# Patient Record
Sex: FEMALE | Race: AMERICAN INDIAN OR ALASKA NATIVE | Employment: UNEMPLOYED | URBAN - METROPOLITAN AREA
[De-identification: names, ages, dates, MRNs, and addresses within clinical notes are randomized per-mention and may not be internally consistent; named-entity substitution may affect disease eponyms.]

---

## 2024-04-29 ENCOUNTER — HOSPITAL ENCOUNTER (OUTPATIENT)
Age: 49
Discharge: HOME | End: 2024-04-29

## 2024-04-29 VITALS
TEMPERATURE: 98 F | SYSTOLIC BLOOD PRESSURE: 151 MMHG | HEART RATE: 89 BPM | DIASTOLIC BLOOD PRESSURE: 79 MMHG | OXYGEN SATURATION: 99 % | RESPIRATION RATE: 16 BRPM

## 2024-04-29 DIAGNOSIS — N81.4 UTERINE PROLAPSE: ICD-10-CM

## 2024-04-29 DIAGNOSIS — N76.0 VAGINOSIS: Primary | ICD-10-CM

## 2024-04-29 LAB
B-HCG UR QL: NEGATIVE
BILIRUB UR QL STRIP: NEGATIVE
COLOR UR: YELLOW
GLUCOSE UR STRIP-MCNC: NEGATIVE MG/DL
HGB UR QL STRIP: NEGATIVE
KETONES UR STRIP-MCNC: NEGATIVE MG/DL
NITRITE UR QL STRIP: NEGATIVE
PH UR STRIP: 6 [PH]
SP GR UR STRIP: 1.02
UROBILINOGEN UR STRIP-ACNC: <2 MG/DL

## 2024-04-29 PROCEDURE — 87591 N.GONORRHOEAE DNA AMP PROB: CPT

## 2024-04-29 PROCEDURE — 81514 NFCT DS BV&VAGINITIS DNA ALG: CPT

## 2024-04-29 PROCEDURE — 81025 URINE PREGNANCY TEST: CPT

## 2024-04-29 PROCEDURE — 87491 CHLMYD TRACH DNA AMP PROBE: CPT

## 2024-04-29 PROCEDURE — 87077 CULTURE AEROBIC IDENTIFY: CPT

## 2024-04-29 PROCEDURE — 87086 URINE CULTURE/COLONY COUNT: CPT

## 2024-04-29 PROCEDURE — 99214 OFFICE O/P EST MOD 30 MIN: CPT

## 2024-04-29 PROCEDURE — 81002 URINALYSIS NONAUTO W/O SCOPE: CPT

## 2024-04-29 NOTE — ED INITIAL ASSESSMENT (HPI)
Pt states on Wednesday a \"small pale ball fell out of her intimate parts\" denies pain. Says its never happened before, or after.

## 2024-04-29 NOTE — ED PROVIDER NOTES
Patient Seen in: [facility]      History     Chief Complaint   Patient presents with    Eval-G     Stated Complaint: Eval G    Subjective:   Camryn is a 49-year-old female presenting to the immediate care complaining of a vaginal issue.  Patient states that 6 days ago she noticed a \"ball\" that was protruding from her private area.  Patient's primary language is Divehi and she says that she has difficulty describing what she saw.  Initially said that the ball fell out.  But then after more questioning she said that the ball went back in when she put her clothes on.  Patient states that she has otherwise not had any vaginal bleeding or discharge.  She does still get periods regularly however her last 1 was 2 months ago.  Patient states that she has had 5 children via vaginal delivery.  She denies any urinary symptoms.  Denies any abdominal pain, back pain, flank pain.  She has not had a fever, nausea, vomiting, diarrhea.  She has been eating and drinking well and is well-hydrated.  She has otherwise has no other complaints or concerns.          Objective:   No pertinent past medical history.            No pertinent past surgical history.              No pertinent social history.            Review of Systems    Positive for stated complaint: Eval G  Other systems are as noted in HPI.  Constitutional and vital signs reviewed.      All other systems reviewed and negative except as noted above.    Physical Exam     ED Triage Vitals [04/29/24 1029]   /79   Pulse 89   Resp 16   Temp 98 °F (36.7 °C)   Temp src Temporal   SpO2 99 %   O2 Device None (Room air)       Current:/79   Pulse 89   Temp 98 °F (36.7 °C) (Temporal)   Resp 16   LMP 03/06/2024 (Approximate)   SpO2 99%         Physical Exam  Vitals and nursing note reviewed. Exam conducted with a chaperone present (Courtney Marcelino RN).   Constitutional:       General: She is not in acute distress.     Appearance: Normal appearance. She is not  ill-appearing, toxic-appearing or diaphoretic.   HENT:      Head: Normocephalic.   Cardiovascular:      Rate and Rhythm: Normal rate.   Pulmonary:      Effort: Pulmonary effort is normal. No respiratory distress.   Abdominal:      General: Abdomen is flat. Bowel sounds are normal. There is no distension.      Palpations: Abdomen is soft. There is no mass.      Tenderness: There is no abdominal tenderness. There is no right CVA tenderness, left CVA tenderness, guarding or rebound.      Hernia: No hernia is present.   Genitourinary:     Pubic Area: No rash or pubic lice.       Labia:         Right: No rash, tenderness, lesion or injury.         Left: No rash, tenderness, lesion or injury.       Urethra: No prolapse, urethral pain, urethral swelling or urethral lesion.      Vagina: No signs of injury and foreign body. Prolapsed vaginal walls present. No vaginal discharge, erythema, tenderness, bleeding or lesions.      Cervix: Normal.      Uterus: With uterine prolapse. Not deviated, not enlarged, not fixed and not tender.       Adnexa: Right adnexa normal and left adnexa normal.   Musculoskeletal:         General: Normal range of motion.      Cervical back: Normal range of motion.   Skin:     General: Skin is warm and dry.      Capillary Refill: Capillary refill takes less than 2 seconds.   Neurological:      General: No focal deficit present.      Mental Status: She is alert and oriented to person, place, and time.   Psychiatric:         Mood and Affect: Mood normal.         Behavior: Behavior normal.         Thought Content: Thought content normal.         Judgment: Judgment normal.              ED Course     Labs Reviewed   [facility] POCT URINALYSIS DIPSTICK - Abnormal; Notable for the following components:       Result Value    Urine Clarity Cloudy (*)     Protein urine Trace (*)     Leukocyte esterase urine Trace (*)     All other components within normal limits   POCT PREGNANCY URINE - Normal   URINE CULTURE, ROUTINE    CHLAMYDIA/GONOCOCCUS, GARRETT   VAGINITIS VAGINOSIS PCR PANEL            MDM                Medical Decision Making  Multiple medical diagnoses were considered including but not limited to UTI, STD, uterine prolapse, bladder prolapse.  Patient is well appearing, non-toxic and in no acute distress.  Vital signs are stable.   Patient's history and physical exam are consistent with a uterine prolapse.  There is no prolapse of the bladder.  Patient has no pain on physical exam of her abdomen or vaginal area.  Urine dip had some trace leukocytes, will send for culture and follow.  I did do testing for bacterial vaginosis, yeast, trichomonas, gonorrhea and chlamydia.  Patient does have a small amount of discharge on her vaginal exam.  There was no bleeding on vaginal exam.  I recommended to the patient that she make an appointment to follow-up with a gynecologist soon as possible for further evaluation and management.  I recommended that the patient not lift anything over 5 pounds until follow-up with GYN.  Patient states that she does lift regularly at work.  Recommended that if the patient has any pain, prolapse that will not retract, vaginal bleeding or discharge or any other worsening or concerning complaints that she go to the emergency department.  Otherwise recommended following up with GYN and primary care provider as discussed.  ED precautions discussed.  Patient (guardian) advised to follow up with PCP in 2-3 days.  Patient (guardian) agrees with this plan of care.  Patient (guardian) verbalizes understanding of discharge instructions and plan of care.  Entire visit was conducted with  #[id number].      Amount and/or Complexity of Data Reviewed  Labs: ordered. Decision-making details documented in ED Course.        Disposition and Plan     Clinical Impression:  1. Vaginosis    2. Uterine prolapse         Disposition:  Discharge  4/29/2024 11:15 am    Follow-up:  Asaf Oliver DO  [address]  [place]  [address]  [facility] [address]  [phone]                Medications Prescribed:  There are no discharge medications for this patient.

## 2024-04-30 ENCOUNTER — OFFICE VISIT (OUTPATIENT)
Dept: OBGYN CLINIC | Facility: CLINIC | Age: 49
End: 2024-04-30

## 2024-04-30 VITALS
DIASTOLIC BLOOD PRESSURE: 73 MMHG | BODY MASS INDEX: 20.03 KG/M2 | SYSTOLIC BLOOD PRESSURE: 120 MMHG | HEIGHT: 60 IN | WEIGHT: 102 LBS

## 2024-04-30 DIAGNOSIS — Z12.31 ENCOUNTER FOR SCREENING MAMMOGRAM FOR BREAST CANCER: ICD-10-CM

## 2024-04-30 DIAGNOSIS — N81.4 UTERINE PROLAPSE: ICD-10-CM

## 2024-04-30 DIAGNOSIS — Z12.4 CERVICAL CANCER SCREENING: Primary | ICD-10-CM

## 2024-04-30 LAB
BV BACTERIA DNA VAG QL NAA+PROBE: NEGATIVE
C GLABRATA DNA VAG QL NAA+PROBE: NEGATIVE
C KRUSEI DNA VAG QL NAA+PROBE: NEGATIVE
CANDIDA DNA VAG QL NAA+PROBE: POSITIVE
T VAGINALIS DNA VAG QL NAA+PROBE: NEGATIVE

## 2024-04-30 PROCEDURE — 99386 PREV VISIT NEW AGE 40-64: CPT | Performed by: STUDENT IN AN ORGANIZED HEALTH CARE EDUCATION/TRAINING PROGRAM

## 2024-04-30 PROCEDURE — 96127 BRIEF EMOTIONAL/BEHAV ASSMT: CPT | Performed by: STUDENT IN AN ORGANIZED HEALTH CARE EDUCATION/TRAINING PROGRAM

## 2024-04-30 RX ORDER — FLUCONAZOLE 150 MG/1
150 TABLET ORAL ONCE
Qty: 1 TABLET | Refills: 0 | Status: SHIPPED | OUTPATIENT
Start: 2024-04-30 | End: 2024-04-30

## 2024-04-30 NOTE — PROGRESS NOTES
[facility]  Obstetrics and Gynecology  Annual  Guillermo Winston PA-C    Chief Complaint   Patient presents with    Gyn Exam     Pt states has been having prolapse. Hasn't had a pap since 3 years ago.       Camryn Coyle is a 49 year old female  presenting to establish care and to discuss her concerns of possible vaginal prolapse. Patient states she first noticed it about 1 week ago where she felt a ball in the vaginal area which went back in. States when she walks long distances she feel the bulge. Denies any heavy lifting. No urinary incontinence. No associated pelvic pain, discharge, or abnormal bleeding. History of 5 vaginal deliveries. Patient's last menstrual period was 2024 (approximate). Cycles are regular with menses lasting about 7 days with heavy flow. She is not on any form of birth control. Sexually active with same partner. States her last pap smear was about 3 years ago with normal results. Denies any breast pain or masses. No dysuria or hematuria.    Pap: 2020 per patient.   Contraception:none  Mammo: n/a    OBSTETRICS HISTORY:     OB History    Para Term  AB Living   5 5       5   SAB IAB Ectopic Multiple Live Births           5      # Outcome Date GA Lbr Osmin/2nd Weight Sex Type Anes PTL Lv   5 Para //    F NORMAL SPONT   NAREN   4 Para /    F NORMAL SPONT   NAREN   3 Para /    F NORMAL SPONT   NAREN   2 Para 02    F NORMAL SPONT   NAREN   1 Para 10/31/98    M NORMAL SPONT   NAREN       GYNE HISTORY:     Menarche: 15 (2024 10:32 AM)  Period Cycle (Days): regular (2024 10:32 AM)  Period Duration (Days): 5-7d (2024 10:32 AM)  Period Flow: heavy (2024 10:32 AM)  Use of Birth Control (if yes, specify type): None (2024 10:32 AM)  Hx Prior Abnormal Pap: No (2024 10:32 AM)      History   Sexual Activity    Sexual activity: Not Currently    Partners: Male            No data to display                  MEDICAL HISTORY:     No past  medical history on file.   History reviewed. No pertinent surgical history.    SOCIAL HISTORY:     Tobacco Use: Low Risk  (4/30/2024)    Patient History     Smoking Tobacco Use: Never     Smokeless Tobacco Use: Never     Passive Exposure: Never       Depression Screening:   Depression Screening (PHQ-2/PHQ-9): Over the LAST 2 WEEKS   Little interest or pleasure in doing things (over the last two weeks)?: Not at all  Little interest or pleasure in doing things: Not at all    Feeling down, depressed, or hopeless (over the last two weeks)?: Not at all  Feeling down, depressed, or hopeless: Not at all    PHQ-2 SCORE: 0  PHQ-2 SCORE: 0          FAMILY HISTORY:     History reviewed. No pertinent family history.    MEDICATIONS:     No current outpatient medications on file.    ALLERGIES:     No Known Allergies    REVIEW OF SYSTEMS:     Review of Systems   Constitutional:  Negative for appetite change, chills and fever.   Gastrointestinal:  Negative for abdominal pain, constipation, diarrhea, nausea and vomiting.   Genitourinary:  Negative for difficulty urinating, dysuria, flank pain, frequency, genital sores, hematuria, menstrual problem, pelvic pain, urgency, vaginal bleeding, vaginal discharge and vaginal pain.        Prolapse   Skin:  Negative for rash.   Neurological:  Negative for dizziness, light-headedness and headaches.         PHYSICAL EXAM:     Vitals:    04/30/24 1029   BP: 120/73   Weight: 102 lb (46.3 kg)   Height: 5' (1.524 m)       Body mass index is 19.92 kg/m².     Constitutional: well developed, well nourished  Psychiatric:  Oriented to time, place, person and situation. Appropriate mood and affect  Head/Face: normocephalic  Neck/Thyroid: thyroid symmetric, no thyromegaly, no nodules, no adenopathy  Lymphatic:no abnormal supraclavicular or axillary adenopathy is noted  Breast: normal without palpable masses, tenderness, asymmetry, nipple discharge, nipple retraction or skin changes  Abdomen:  soft,  nontender, nondistended, no masses  Skin/Hair: no unusual rashes or bruises  Extremities: no edema, no cyanosis    Pelvic Exam:  External Genitalia: normal appearance, hair distribution, and no lesions  Urethral Meatus:  normal in size, location, without lesions and prolapse  Bladder:  No fullness, masses or tenderness  Vagina:  Normal appearance without lesions, no abnormal discharge, grade 1 uterine prolapse noted.  Cervix:  Normal without tenderness on motion  Uterus: normal in size, contour, position, mobility, without tenderness  Adnexa: normal without masses or tenderness  Perineum: normal  Anus: no hemorroids       ASSESSMENT:     Camryn was seen today for gyn exam.    Diagnoses and all orders for this visit:    Cervical cancer screening  -     ThinPrep PAP Smear; Future  -     Hpv Dna  High Risk , Thin Prep Collect; Future    Uterine prolapse  -     Urogynecology Referral - In Network    Encounter for screening mammogram for breast cancer  -     [facility] JAKOB 2D+3D SCREENING BILAT (CPT=77067/01510); Future            PLAN:   Normal exam.  Pap smear done.   Recommend repeat pap smear with co-testing every 3 years for normal/ -HPV.  Recommend annual screening mammograms.   Recommend screening colonoscopy starting at 50 or earlier if significant family history or concerning symptoms.   Contraceptive Counseling as needed.   Maintain healthy lifestyle with well-balance diet and daily exercise.  Return to clinic in one year or as needed.    Uterine prolapse: Patient given referral to urogynecology.       SUMMARY:  Pap: Next cotest 3-5 years per ASCCP guidelines.  BCM:  None  STD screening: No  Mammogram: ordered placed   updated    FOLLOW-UP     No follow-ups on file.    BETH HERNANDEZ PA-C  10:41 AM  4/30/2024    Note to patient and family:  The 21st Century Cures Act makes medical notes available to patients in the interest of transparency.  However, please be advised that this is a medical document.  It is  intended as a peer to peer communication.  It is written in medical language and may contain abbreviations or verbiage that are technical and unfamiliar.  It may appear blunt or direct.  Medical documents are intended to carry relevant information, facts as evident, and the clinical opinion of the practitioner.

## 2024-05-01 LAB
C TRACH DNA SPEC QL NAA+PROBE: NEGATIVE
HPV I/H RISK 1 DNA SPEC QL NAA+PROBE: NEGATIVE
N GONORRHOEA DNA SPEC QL NAA+PROBE: NEGATIVE

## 2024-05-06 LAB
.: NORMAL
.: NORMAL
